# Patient Record
Sex: MALE | Race: WHITE | NOT HISPANIC OR LATINO | Employment: FULL TIME | URBAN - METROPOLITAN AREA
[De-identification: names, ages, dates, MRNs, and addresses within clinical notes are randomized per-mention and may not be internally consistent; named-entity substitution may affect disease eponyms.]

---

## 2022-03-04 ENCOUNTER — ANCILLARY PROCEDURE (OUTPATIENT)
Dept: GENERAL RADIOLOGY | Facility: CLINIC | Age: 67
End: 2022-03-04
Attending: PHYSICIAN ASSISTANT
Payer: COMMERCIAL

## 2022-03-04 ENCOUNTER — OFFICE VISIT (OUTPATIENT)
Dept: URGENT CARE | Facility: URGENT CARE | Age: 67
End: 2022-03-04
Payer: COMMERCIAL

## 2022-03-04 VITALS
HEART RATE: 71 BPM | SYSTOLIC BLOOD PRESSURE: 156 MMHG | TEMPERATURE: 97.2 F | WEIGHT: 212.2 LBS | OXYGEN SATURATION: 99 % | DIASTOLIC BLOOD PRESSURE: 98 MMHG

## 2022-03-04 DIAGNOSIS — Z98.890 H/O LEFT KNEE SURGERY: ICD-10-CM

## 2022-03-04 DIAGNOSIS — C85.90 LYMPHOMA, UNSPECIFIED BODY REGION, UNSPECIFIED LYMPHOMA TYPE (H): ICD-10-CM

## 2022-03-04 DIAGNOSIS — R03.0 ELEVATED BLOOD PRESSURE READING WITHOUT DIAGNOSIS OF HYPERTENSION: ICD-10-CM

## 2022-03-04 DIAGNOSIS — S89.92XA INJURY OF LEFT KNEE, INITIAL ENCOUNTER: Primary | ICD-10-CM

## 2022-03-04 DIAGNOSIS — M17.12 OSTEOARTHRITIS OF LEFT KNEE, UNSPECIFIED OSTEOARTHRITIS TYPE: ICD-10-CM

## 2022-03-04 DIAGNOSIS — S89.92XA INJURY OF LEFT KNEE, INITIAL ENCOUNTER: ICD-10-CM

## 2022-03-04 DIAGNOSIS — M25.462 EFFUSION OF LEFT KNEE: ICD-10-CM

## 2022-03-04 PROCEDURE — 99204 OFFICE O/P NEW MOD 45 MIN: CPT | Performed by: PHYSICIAN ASSISTANT

## 2022-03-04 PROCEDURE — 73562 X-RAY EXAM OF KNEE 3: CPT | Mod: LT | Performed by: RADIOLOGY

## 2022-03-04 RX ORDER — LIDOCAINE 50 MG/G
OINTMENT TOPICAL 2 TIMES DAILY
Qty: 30 G | Refills: 0 | Status: SHIPPED | OUTPATIENT
Start: 2022-03-04 | End: 2022-03-09

## 2022-03-04 RX ORDER — HYDROCODONE BITARTRATE AND ACETAMINOPHEN 5; 325 MG/1; MG/1
1 TABLET ORAL EVERY 8 HOURS PRN
Qty: 12 TABLET | Refills: 0 | Status: SHIPPED | OUTPATIENT
Start: 2022-03-04 | End: 2022-03-08

## 2022-03-04 NOTE — PROGRESS NOTES
Chief Complaint   Patient presents with     Knee Pain     L knee pain x 2 days, associated swelling after slipped on ice and hyperextended knee, hx of arthritis, surgeries and no ACL         Medical Decision Making:    Differential Diagnosis:  MS Injury Pain: sprain, fracture, muscle strain, contusion, dislocation and osteoarthritis    xrays- severe arthritis, many arthritic changes, ? Loose body in joint      Results for orders placed or performed in visit on 03/04/22   XR Knee Left 3 Views     Status: None (Preliminary result)    Narrative    KNEE LEFT THREE VIEWS  March 4, 2022 1:55 PM     HISTORY: Slipped on ice, left knee swelling/pain. History of arthritis  and prior surgery, no ACL. Injury of left knee, initial encounter.  History of left knee surgery.    COMPARISON: None.      Impression    IMPRESSION: Advanced tricompartmental degenerative changes. Moderate  joint effusion. No evidence of acute fracture. Probable loose bodies  projecting in the central knee joint.       ASSESSMENT:    ICD-10-CM    1. Injury of left knee, initial encounter  S89.92XA XR Knee Left 3 Views     Orthopedic  Referral     HYDROcodone-acetaminophen (NORCO) 5-325 MG tablet     lidocaine (XYLOCAINE) 5 % external ointment     Knee Supplies Order for DME - ONLY FOR DME   2. Osteoarthritis of left knee, unspecified osteoarthritis type  M17.12    3. Effusion of left knee  M25.462    4. H/O left knee surgery  Z98.890 XR Knee Left 3 Views     Orthopedic  Referral     HYDROcodone-acetaminophen (NORCO) 5-325 MG tablet     lidocaine (XYLOCAINE) 5 % external ointment     Knee Supplies Order for DME - ONLY FOR DME   5. Lymphoma, unspecified body region, unspecified lymphoma type (H)  C85.90 Orthopedic  Referral     HYDROcodone-acetaminophen (NORCO) 5-325 MG tablet     lidocaine (XYLOCAINE) 5 % external ointment     Knee Supplies Order for DME - ONLY FOR DME   6. Elevated blood pressure reading without diagnosis of  hypertension  R03.0 Orthopedic  Referral     HYDROcodone-acetaminophen (NORCO) 5-325 MG tablet     lidocaine (XYLOCAINE) 5 % external ointment     Knee Supplies Order for DME - ONLY FOR DME         PLAN: Acute left knee injury with history of severe arthritis and 2 prior knee surgeries.  Allergy to ibuprofen, facial swelling.  Norco, 12 tablets.  Oral Tylenol.  Topical vital.  Hinged knee brace. Declines crutches.  See Ortho 3 to 5 days.  Ice, elevate.  History of lymphoma, finished chemo November 2021.  Elevated blood pressure likely secondary to pain.  Recheck outside of clinic and follow-up with primary if any concerns.  Miesha Thomas PA-C        SUBJECTIVE:  Oracio Mack is an 66 year old male who presents with left knee pain and swelling after slipping on the ice 2 days ago.  Caught himself and did not actually fall onto the ground.  2 prior left knee surgeries to clean up cartilage.  He states he has no ACL.  History of arthritis and has baseline limited flexion to about 90 degrees.  No numbness or tingling.  Most of the pain is in the posterior lateral aspect.  Allergy to ibuprofen.   History of lymphoma for which he finished chemotherapy in late November 2021.  In remission currently.      No past medical history on file.  History   Smoking Status     Not on file   Smokeless Tobacco     Not on file       ROS:  GEN no fevers  SKIN no erythema  Musculoskeletal:  See HPI.      OBJECTIVE:  Blood pressure (!) 156/98, pulse 71, temperature 97.2  F (36.2  C), temperature source Tympanic, weight 96.3 kg (212 lb 3.2 oz), SpO2 99 %.  Patient is alert and NAD.  EYES: conjunctiva clear  Knee Exam (left):  Inspection: Significant effusion.  Palpation: Tender posterior lateral knee.  Mildly tender lateral joint line.  Laxity laxity noted with anterior drawer and Lachman's test.  No increased pain with varus/valgus stress. Flexion 120 degrees, ext 150 degrees  Cap refill intact.    Good doralis  pedis.  Neurovascularly Intact Distally.         Miesha Thomas PA-C

## 2022-03-07 ENCOUNTER — OFFICE VISIT (OUTPATIENT)
Dept: ORTHOPEDICS | Facility: CLINIC | Age: 67
End: 2022-03-07
Attending: PHYSICIAN ASSISTANT
Payer: COMMERCIAL

## 2022-03-07 VITALS — DIASTOLIC BLOOD PRESSURE: 75 MMHG | SYSTOLIC BLOOD PRESSURE: 138 MMHG | HEART RATE: 76 BPM

## 2022-03-07 DIAGNOSIS — S89.92XA INJURY OF LEFT KNEE, INITIAL ENCOUNTER: Primary | ICD-10-CM

## 2022-03-07 DIAGNOSIS — M17.32 POST-TRAUMATIC OSTEOARTHRITIS OF LEFT KNEE: ICD-10-CM

## 2022-03-07 DIAGNOSIS — M25.462 EFFUSION OF LEFT KNEE: ICD-10-CM

## 2022-03-07 DIAGNOSIS — Z98.890 H/O LEFT KNEE SURGERY: ICD-10-CM

## 2022-03-07 PROCEDURE — 20610 DRAIN/INJ JOINT/BURSA W/O US: CPT | Mod: LT | Performed by: PHYSICIAN ASSISTANT

## 2022-03-07 PROCEDURE — 99203 OFFICE O/P NEW LOW 30 MIN: CPT | Mod: 25 | Performed by: ORTHOPAEDIC SURGERY

## 2022-03-07 RX ORDER — BUPIVACAINE HYDROCHLORIDE 2.5 MG/ML
4 INJECTION, SOLUTION INFILTRATION; PERINEURAL
Status: SHIPPED | OUTPATIENT
Start: 2022-03-07

## 2022-03-07 RX ADMIN — BUPIVACAINE HYDROCHLORIDE 4 ML: 2.5 INJECTION, SOLUTION INFILTRATION; PERINEURAL at 14:40

## 2022-03-07 ASSESSMENT — PAIN SCALES - GENERAL: PAINLEVEL: MODERATE PAIN (5)

## 2022-03-07 NOTE — PROGRESS NOTES
"CHIEF COMPLAINT:   Chief Complaint   Patient presents with     Left Knee - Injury     DOI 3/2/22, 5 day s/p. Patient notes he had a bad knee for a long time but it just started acting up. Patient notes he slipped on the ice and bent his knee backwards a little. He has trouble walking now. Pain is posterior and lateral. He has used pain      Knee Pain     medication and lidocaine cream.    .        HISTORY OF PRESENT ILLNESS    Oracio Mack is an 66 year old male who presents with left knee pain and swelling after slipping on the ice 5 days ago.  Caught himself and did not actually fall onto the ground, but his knee bent backwards (flexion) further than it typically goes (~90 degrees), now trouble walking. He notes a \"bad\" left knee for a long time but just started acting up recently.  2 prior left knee surgeries to clean up cartilage. Blew out his knee at age 27, He states he has no ACL.  History of arthritis and has baseline limited flexion to about 90 degrees.  No numbness or tingling.  Most of the pain is in the posterior lateral aspect.    Was seen in Urgent Care on 3/4/2022, xrays negative for fracture. States he was not given crutches so limping around.      Allergy to ibuprofen (facial swelling).     Reports history of cortisone injections in the past, last was more than a year ago, seemed to help.    History of lymphoma for which he finished chemotherapy in late November 2021.  In remission currently.          Present symptoms: pain posteriorly and diffuse, pain dull/achy , moderate pain, severe swelling.    Pain severity: 5/10  Frequency of symptoms: are constant  Exacerbating Factors: weight bearing  Relieving Factors: rest  Night Pain: Yes  Pain while at rest: Yes     Patient has tried:     NSAIDS: No , unable to take     Physical Therapy: No      Activity modification: Yes      Bracing: No      Injections: Yes in the past more than a year ago, per patient report.     Ice: No      Assistive device:  " No      Other PMH:  has no past medical history on file.  There is no problem list on file for this patient.      Surgical Hx:  has no past surgical history on file.    Medications:   Current Outpatient Medications:      HYDROcodone-acetaminophen (NORCO) 5-325 MG tablet, Take 1 tablet by mouth every 8 hours as needed for pain, Disp: 12 tablet, Rfl: 0     lidocaine (XYLOCAINE) 5 % external ointment, Apply topically 2 times daily for 5 days, Disp: 30 g, Rfl: 0    Allergies:   Allergies   Allergen Reactions     Ibuprofen      Other reaction(s): Angioedema  Swelling Lips.  Other reaction(s): Angioedema  Other reaction(s): Angioedema  Swelling Lips.         Social Hx: CPA.   reports that he has never smoked. He has never used smokeless tobacco.    Family Hx: family history is not on file.    REVIEW OF SYSTEMS: 10 point ROS neg other than the symptoms noted above in the HPI and PMH. Notables include  CONSTITUTIONAL:NEGATIVE for fever, chills, change in weight  INTEGUMENTARY/SKIN: NEGATIVE for worrisome rashes, moles or lesions  MUSCULOSKELETAL:See HPI above  NEURO: NEGATIVE for weakness, dizziness or paresthesias    PHYSICAL EXAM:  /75   Pulse 76    GENERAL APPEARANCE: healthy, alert, no distress  SKIN: no suspicious lesions or rashes  NEURO: Normal strength and tone, mentation intact and speech normal  PSYCH:  mentation appears normal and affect normal, not anxious  RESPIRATORY: No increased work of breathing.  HANDS: no clubbing, nail pitting  LYMPH: no palpable popliteal lymphadenopathy.    BILATERAL LOWER EXTREMITIES:  Gait: in wheelchair.  No gross deformities or masses.  Bilateral Quad atrophy, strength weak  Intact sensation deep peroneal nerve, superficial peroneal nerve, med/lat tibial nerve, sural nerve, saphenous nerve  Intact EHL, EDL, TA, FHL, GS, quadriceps hamstrings and hip flexors  Palpable 2+ dp pulses.  Bilateral calf soft and nttp or squeeze.  Edema: trace    LEFT KNEE EXAM:    Skin: intact, no  "ecchymosis or erythema  Notable swelling  ROM: 10 extension to 70 flexion  Tight hamstrings on straight leg raise.  Effusion: large  Tender: anterolateral knee, popliteal fossa, mild joint lines  Nontender to palpation medial / lateral proximal tibia, femoral epicondyles.    Mild varus/valgus laxity  Lachmans: at least 2B, limited by swelling.      RIGHT KNEE EXAM:    Skin: intact, no ecchymosis or erythema  ROM: full extension to 135 flexion  Tight hamstrings on straight leg raise.  Effusion: none  Tender: NTTP med/lat joint line, anterior or posterior knee  McMurrays: negative    MCL: stable, and non-painful at both 0 and 30 degrees knee flexion  Varus stress: stable, and non-painful at both 0 and 30 degrees knee flexion  Lachmans: neg, firm endpoint  Posterior Drawer stable  Patellofemoral joint:                Apprehension: negative              Crepitations: minimal    X-RAY:  3 views left knee from 3/4/2022 were reviewed in clinic today. On my review, no obvious fractures or dislocations. Advanced tricompartmental degenerative changes. Moderate joint effusion. No evidence of acute fracture. Probable loose bodies projecting in the central knee joint.           ASSESSMENT/PLAN: Oracio Mack is a 66 year old male with acute on chronic left knee pain following injury, post-traumatic osteoarthritis, effusion, history of left knee surgeries.     * reviewed imaging studies with patient, showing arthritic changes, or wearing of the cartilage in the knee. This can be caused by normal \"wear and tear\" over the years or following prior injury to the knee.  * suspect recent flexion, beyond what he typically tolerates, has aggravated the knee causing pain, swelling. Likely tearing of some scar tissues / contracture, aggravation of underlying osteoarthritis.    Treatment typically starts nonsurgically. Surgical indication for total knee arthroplasty  when nonsurgical management is no longer effective.    Non-surgical " treatment for knee arthritis includes:    * rest, sitting  * Activity modification - avoid impact activities or activities that aggravate symptoms.  * NSAIDS (non-steroidal anti-inflammatory medications; e.g. Aleve, advil, motrin, ibuprofen) - regular use for inflammation ( twice daily or three times daily), with food, as long as no contra-indications Please discuss with primary care doctor if needed  * ice, 15-20 minutes at a time several times a day or as needed.  * Strengthening of quadriceps muscles  * Physical Therapy for strengthening, stretching and range of motion exercises of legs  * Tylenol as needed for pain, consider Tylenol arthritis or similar  * Weight loss: Weight loss:  There is no height or weight on file to calculate BMI.. weight loss benefits, not only for the current pain symptoms, but also overall health. Recommend a good diet plan that works for the patient, with the assistance of a dietician or primary care doctor as needed. Also, a good, low-impact exercise program for at least 20 minutes per day, 3 times per week, such as exercise bike, elliptical , or pool.  * Exercise: low impact such as stationary bike, elliptical, pool.  * discussed option of draining some of the fluid from the knee to see if can speed up recovery. Patient elects to proceed.  * Bracing: bracing the knee may offer some relief of symptoms when worn and provide some stability. Reports has a brace from visit last week but not wearing it since he's not walking much.  * over the counter supplements such as glucosamine and chondroitin sulfate may help with joint pain.  * topical ointments may help as well    * return to clinic as needed. Consider cortisone in future as needed.    * crutches provided today.      Krishna Fallon M.D., M.S.  Dept. of Orthopaedic Surgery  [facility]    Large Joint Injection/Arthocentesis: L knee joint    Date/Time: 3/7/2022 2:40 PM  Performed by: Gamaliel Webster,  PA  Authorized by: Krishna Fallon MD     Indications:  Pain and osteoarthritis  Needle Size:  22 G  Guidance: landmark guided    Approach:  Superolateral  Location:  Knee      Medications:  4 mL bupivacaine 0.25 %  Medications comment:  Injected into superolateral subcutaneous tissue  Aspirate amount (mL):  90  Aspirate:  Yellow and cloudy  Outcome:  Tolerated well, no immediate complications  Procedure discussed: discussed risks, benefits, and alternatives    Consent Given by:  Patient  Prep: patient was prepped and draped in usual sterile fashion

## 2022-03-07 NOTE — LETTER
"    3/7/2022         RE: Oracio Mack  [address]  [place] [address]        Dear Colleague,    Thank you for referring your patient, Oracio Mack, to the [facility]. Please see a copy of my visit note below.    CHIEF COMPLAINT:   Chief Complaint   Patient presents with     Left Knee - Injury     DOI 3/2/22, 5 day s/p. Patient notes he had a bad knee for a long time but it just started acting up. Patient notes he slipped on the ice and bent his knee backwards a little. He has trouble walking now. Pain is posterior and lateral. He has used pain      Knee Pain     medication and lidocaine cream.    .        HISTORY OF PRESENT ILLNESS    Oracio Mack is an 66 year old male who presents with left knee pain and swelling after slipping on the ice 5 days ago.  Caught himself and did not actually fall onto the ground, but his knee bent backwards (flexion) further than it typically goes (~90 degrees), now trouble walking. He notes a \"bad\" left knee for a long time but just started acting up recently.  2 prior left knee surgeries to clean up cartilage. Blew out his knee at age 27, He states he has no ACL.  History of arthritis and has baseline limited flexion to about 90 degrees.  No numbness or tingling.  Most of the pain is in the posterior lateral aspect.    Was seen in Urgent Care on 3/4/2022, xrays negative for fracture. States he was not given crutches so limping around.      Allergy to ibuprofen (facial swelling).     Reports history of cortisone injections in the past, last was more than a year ago, seemed to help.    History of lymphoma for which he finished chemotherapy in late November 2021.  In remission currently.          Present symptoms: pain posteriorly and diffuse, pain dull/achy , moderate pain, severe swelling.    Pain severity: 5/10  Frequency of symptoms: are constant  Exacerbating Factors: weight bearing  Relieving Factors: rest  Night Pain: Yes  Pain while " at rest: Yes     Patient has tried:     NSAIDS: No , unable to take     Physical Therapy: No      Activity modification: Yes      Bracing: No      Injections: Yes in the past more than a year ago, per patient report.     Ice: No      Assistive device:  No      Other PMH:  has no past medical history on file.  There is no problem list on file for this patient.      Surgical Hx:  has no past surgical history on file.    Medications:   Current Outpatient Medications:      HYDROcodone-acetaminophen (NORCO) 5-325 MG tablet, Take 1 tablet by mouth every 8 hours as needed for pain, Disp: 12 tablet, Rfl: 0     lidocaine (XYLOCAINE) 5 % external ointment, Apply topically 2 times daily for 5 days, Disp: 30 g, Rfl: 0    Allergies:   Allergies   Allergen Reactions     Ibuprofen      Other reaction(s): Angioedema  Swelling Lips.  Other reaction(s): Angioedema  Other reaction(s): Angioedema  Swelling Lips.         Social Hx: CPA.   reports that he has never smoked. He has never used smokeless tobacco.    Family Hx: family history is not on file.    REVIEW OF SYSTEMS: 10 point ROS neg other than the symptoms noted above in the HPI and PMH. Notables include  CONSTITUTIONAL:NEGATIVE for fever, chills, change in weight  INTEGUMENTARY/SKIN: NEGATIVE for worrisome rashes, moles or lesions  MUSCULOSKELETAL:See HPI above  NEURO: NEGATIVE for weakness, dizziness or paresthesias    PHYSICAL EXAM:  /75   Pulse 76    GENERAL APPEARANCE: healthy, alert, no distress  SKIN: no suspicious lesions or rashes  NEURO: Normal strength and tone, mentation intact and speech normal  PSYCH:  mentation appears normal and affect normal, not anxious  RESPIRATORY: No increased work of breathing.  HANDS: no clubbing, nail pitting  LYMPH: no palpable popliteal lymphadenopathy.    BILATERAL LOWER EXTREMITIES:  Gait: in wheelchair.  No gross deformities or masses.  Bilateral Quad atrophy, strength weak  Intact sensation deep peroneal nerve, superficial  "peroneal nerve, med/lat tibial nerve, sural nerve, saphenous nerve  Intact EHL, EDL, TA, FHL, GS, quadriceps hamstrings and hip flexors  Palpable 2+ dp pulses.  Bilateral calf soft and nttp or squeeze.  Edema: trace    LEFT KNEE EXAM:    Skin: intact, no ecchymosis or erythema  Notable swelling  ROM: 10 extension to 70 flexion  Tight hamstrings on straight leg raise.  Effusion: large  Tender: anterolateral knee, popliteal fossa, mild joint lines  Nontender to palpation medial / lateral proximal tibia, femoral epicondyles.    Mild varus/valgus laxity  Lachmans: at least 2B, limited by swelling.      RIGHT KNEE EXAM:    Skin: intact, no ecchymosis or erythema  ROM: full extension to 135 flexion  Tight hamstrings on straight leg raise.  Effusion: none  Tender: NTTP med/lat joint line, anterior or posterior knee  McMurrays: negative    MCL: stable, and non-painful at both 0 and 30 degrees knee flexion  Varus stress: stable, and non-painful at both 0 and 30 degrees knee flexion  Lachmans: neg, firm endpoint  Posterior Drawer stable  Patellofemoral joint:                Apprehension: negative              Crepitations: minimal    X-RAY:  3 views left knee from 3/4/2022 were reviewed in clinic today. On my review, no obvious fractures or dislocations. Advanced tricompartmental degenerative changes. Moderate joint effusion. No evidence of acute fracture. Probable loose bodies projecting in the central knee joint.           ASSESSMENT/PLAN: Oracio Mack is a 66 year old male with acute on chronic left knee pain following injury, post-traumatic osteoarthritis, effusion, history of left knee surgeries.     * reviewed imaging studies with patient, showing arthritic changes, or wearing of the cartilage in the knee. This can be caused by normal \"wear and tear\" over the years or following prior injury to the knee.  * suspect recent flexion, beyond what he typically tolerates, has aggravated the knee causing pain, swelling. Likely " tearing of some scar tissues / contracture, aggravation of underlying osteoarthritis.    Treatment typically starts nonsurgically. Surgical indication for total knee arthroplasty  when nonsurgical management is no longer effective.    Non-surgical treatment for knee arthritis includes:    * rest, sitting  * Activity modification - avoid impact activities or activities that aggravate symptoms.  * NSAIDS (non-steroidal anti-inflammatory medications; e.g. Aleve, advil, motrin, ibuprofen) - regular use for inflammation ( twice daily or three times daily), with food, as long as no contra-indications Please discuss with primary care doctor if needed  * ice, 15-20 minutes at a time several times a day or as needed.  * Strengthening of quadriceps muscles  * Physical Therapy for strengthening, stretching and range of motion exercises of legs  * Tylenol as needed for pain, consider Tylenol arthritis or similar  * Weight loss: Weight loss:  There is no height or weight on file to calculate BMI.. weight loss benefits, not only for the current pain symptoms, but also overall health. Recommend a good diet plan that works for the patient, with the assistance of a dietician or primary care doctor as needed. Also, a good, low-impact exercise program for at least 20 minutes per day, 3 times per week, such as exercise bike, elliptical , or pool.  * Exercise: low impact such as stationary bike, elliptical, pool.  * discussed option of draining some of the fluid from the knee to see if can speed up recovery. Patient elects to proceed.  * Bracing: bracing the knee may offer some relief of symptoms when worn and provide some stability. Reports has a brace from visit last week but not wearing it since he's not walking much.  * over the counter supplements such as glucosamine and chondroitin sulfate may help with joint pain.  * topical ointments may help as well    * return to clinic as needed. Consider cortisone in future as  needed.    * crutches provided today.      Krishna Fallon M.D., M.S.  Dept. of Orthopaedic Surgery  [facility]    Large Joint Injection/Arthocentesis: L knee joint    Date/Time: 3/7/2022 2:40 PM  Performed by: Gamaliel Webster PA  Authorized by: Krishna Fallon MD     Indications:  Pain and osteoarthritis  Needle Size:  22 G  Guidance: landmark guided    Approach:  Superolateral  Location:  Knee      Medications:  4 mL bupivacaine 0.25 %  Medications comment:  Injected into superolateral subcutaneous tissue  Aspirate amount (mL):  90  Aspirate:  Yellow and cloudy  Outcome:  Tolerated well, no immediate complications  Procedure discussed: discussed risks, benefits, and alternatives    Consent Given by:  Patient  Prep: patient was prepped and draped in usual sterile fashion              Again, thank you for allowing me to participate in the care of your patient.        Sincerely,        Krishna Fallon MD

## 2022-04-03 ENCOUNTER — HEALTH MAINTENANCE LETTER (OUTPATIENT)
Age: 67
End: 2022-04-03

## 2022-05-29 ENCOUNTER — HEALTH MAINTENANCE LETTER (OUTPATIENT)
Age: 67
End: 2022-05-29

## 2022-10-03 ENCOUNTER — HEALTH MAINTENANCE LETTER (OUTPATIENT)
Age: 67
End: 2022-10-03

## 2023-06-04 ENCOUNTER — HEALTH MAINTENANCE LETTER (OUTPATIENT)
Age: 68
End: 2023-06-04

## 2024-07-28 ENCOUNTER — HEALTH MAINTENANCE LETTER (OUTPATIENT)
Age: 69
End: 2024-07-28